# Patient Record
Sex: FEMALE | URBAN - METROPOLITAN AREA
[De-identification: names, ages, dates, MRNs, and addresses within clinical notes are randomized per-mention and may not be internally consistent; named-entity substitution may affect disease eponyms.]

---

## 2018-11-08 ENCOUNTER — HOSPITAL ENCOUNTER (OUTPATIENT)
Age: 63
Setting detail: SPECIMEN
Discharge: HOME | End: 2018-11-08
Payer: COMMERCIAL

## 2018-11-13 LAB — SURGICAL PATHOLOGY REPORT: NORMAL

## 2022-07-06 RX ORDER — ANASTROZOLE 1 MG/1
TABLET ORAL
COMMUNITY

## 2022-09-22 PROBLEM — R73.03 PREDIABETES: Status: ACTIVE | Noted: 2022-09-22

## 2022-09-22 PROBLEM — C50.411 MALIGNANT NEOPLASM OF UPPER-OUTER QUADRANT OF RIGHT FEMALE BREAST (HCC): Status: ACTIVE | Noted: 2022-09-22

## 2022-09-22 PROBLEM — C50.919 MALIGNANT NEOPLASM OF BREAST (HCC): Status: ACTIVE | Noted: 2022-09-22

## 2022-09-22 PROBLEM — E66.9 OBESITY: Status: ACTIVE | Noted: 2022-09-22

## 2022-09-22 PROBLEM — R74.8 ABNORMAL LEVELS OF OTHER SERUM ENZYMES: Status: ACTIVE | Noted: 2022-09-22

## 2022-09-22 PROBLEM — N32.81 OVERACTIVE BLADDER: Status: ACTIVE | Noted: 2022-09-22

## 2022-11-05 PROBLEM — Z85.3 PERSONAL HISTORY OF MALIGNANT NEOPLASM OF BREAST: Status: ACTIVE | Noted: 2022-11-05

## 2022-11-05 PROBLEM — Z79.811 LONG TERM CURRENT USE OF AROMATASE INHIBITOR: Status: ACTIVE | Noted: 2022-11-05

## 2022-11-05 PROBLEM — M19.90 ARTHRITIS: Status: ACTIVE | Noted: 2022-11-05

## 2022-11-05 PROBLEM — R59.9 LYMPH NODE ENLARGEMENT: Status: ACTIVE | Noted: 2022-11-05

## 2022-11-17 NOTE — PATIENT DISCUSSION
Spec rx change (decreased minus).  Patient agreed it improved and gained letters on the chart with the change.  Seems content with habitual specs even though they're an older rx.

## 2022-11-17 NOTE — PATIENT DISCUSSION
Discussed appropriate replacement schedule although it sounds like he's that that conversation with other doctors in the past.

## 2022-11-21 ENCOUNTER — NEW PATIENT (OUTPATIENT)
Dept: URBAN - METROPOLITAN AREA CLINIC 14 | Facility: CLINIC | Age: 67
End: 2022-11-21

## 2022-11-21 DIAGNOSIS — H25.13: ICD-10-CM

## 2022-11-21 PROCEDURE — 99204 OFFICE O/P NEW MOD 45 MIN: CPT

## 2022-11-21 PROCEDURE — 92136 OPHTHALMIC BIOMETRY: CPT

## 2022-11-21 ASSESSMENT — VISUAL ACUITY
OS_SC: 20/200
OS_PH: 20/100
OS_BCVA: 20/40
OD_SC: J3
OS_SC: J2
OD_BCVA: 20/30
OD_SC: 20/40

## 2022-11-21 ASSESSMENT — TONOMETRY
OS_IOP_MMHG: 16
OD_IOP_MMHG: 21

## 2022-11-21 ASSESSMENT — KERATOMETRY
OS_AXISANGLE2_DEGREES: 69
OS_K2POWER_DIOPTERS: 45.25
OD_AXISANGLE2_DEGREES: 169
OD_K1POWER_DIOPTERS: 44
OS_K1POWER_DIOPTERS: 45
OD_AXISANGLE_DEGREES: 79
OD_K2POWER_DIOPTERS: 44.75
OS_AXISANGLE_DEGREES: 159

## 2022-12-14 ENCOUNTER — ESTABLISHED PATIENT (OUTPATIENT)
Dept: URBAN - METROPOLITAN AREA CLINIC 14 | Facility: CLINIC | Age: 67
End: 2022-12-14

## 2022-12-14 PROCEDURE — 99199ADVT ADVANCED VISION TESTING

## 2022-12-14 ASSESSMENT — KERATOMETRY
OS_K1POWER_DIOPTERS: 45
OD_K2POWER_DIOPTERS: 44.75
OS_AXISANGLE_DEGREES: 159
OS_K2POWER_DIOPTERS: 45.25
OD_K1POWER_DIOPTERS: 44
OD_AXISANGLE2_DEGREES: 169
OS_AXISANGLE2_DEGREES: 69
OD_AXISANGLE_DEGREES: 79